# Patient Record
Sex: MALE | Race: WHITE | NOT HISPANIC OR LATINO | URBAN - METROPOLITAN AREA
[De-identification: names, ages, dates, MRNs, and addresses within clinical notes are randomized per-mention and may not be internally consistent; named-entity substitution may affect disease eponyms.]

---
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
## 2020-10-17 ENCOUNTER — WALK IN (OUTPATIENT)
Dept: URGENT CARE | Age: 20
End: 2020-10-17

## 2020-10-17 VITALS
HEART RATE: 66 BPM | DIASTOLIC BLOOD PRESSURE: 60 MMHG | SYSTOLIC BLOOD PRESSURE: 98 MMHG | RESPIRATION RATE: 16 BRPM | OXYGEN SATURATION: 97 % | TEMPERATURE: 98 F

## 2020-10-17 DIAGNOSIS — T30.4 CHEMICAL BURN: Primary | ICD-10-CM

## 2020-10-17 PROCEDURE — 99203 OFFICE O/P NEW LOW 30 MIN: CPT | Performed by: FAMILY MEDICINE

## 2020-10-17 RX ORDER — CEPHALEXIN 500 MG/1
500 CAPSULE ORAL 3 TIMES DAILY
Qty: 30 CAPSULE | Refills: 0 | Status: SHIPPED | OUTPATIENT
Start: 2020-10-17 | End: 2020-10-27

## 2020-10-19 ENCOUNTER — WORKER'S COMP (OUTPATIENT)
Dept: OCCUPATIONAL MEDICINE | Age: 20
End: 2020-10-19

## 2020-10-19 DIAGNOSIS — T23.569D: Primary | ICD-10-CM

## 2020-10-19 DIAGNOSIS — L85.3 DRY SKIN DERMATITIS: ICD-10-CM

## 2020-10-19 DIAGNOSIS — M79.642 BILATERAL HAND PAIN: ICD-10-CM

## 2020-10-19 DIAGNOSIS — Z02.71 ISSUE OF MEDICAL CERTIFICATE FOR DISABILITY EXAMINATION: ICD-10-CM

## 2020-10-19 DIAGNOSIS — M79.641 BILATERAL HAND PAIN: ICD-10-CM

## 2020-10-19 DIAGNOSIS — L03.119 CELLULITIS OF HAND: ICD-10-CM

## 2020-10-19 PROBLEM — T23.569A: Status: ACTIVE | Noted: 2020-10-19

## 2020-10-19 PROCEDURE — 99214 OFFICE O/P EST MOD 30 MIN: CPT | Performed by: PHYSICIAN ASSISTANT

## 2020-10-26 ENCOUNTER — APPOINTMENT (OUTPATIENT)
Dept: OCCUPATIONAL MEDICINE | Age: 20
End: 2020-10-26

## 2020-10-26 ENCOUNTER — WORKER'S COMP (OUTPATIENT)
Dept: OCCUPATIONAL MEDICINE | Age: 20
End: 2020-10-26

## 2020-10-26 VITALS
DIASTOLIC BLOOD PRESSURE: 68 MMHG | HEART RATE: 82 BPM | RESPIRATION RATE: 16 BRPM | SYSTOLIC BLOOD PRESSURE: 121 MMHG | TEMPERATURE: 97.7 F

## 2020-10-26 DIAGNOSIS — Z02.71 ISSUE OF MEDICAL CERTIFICATE FOR DISABILITY EXAMINATION: ICD-10-CM

## 2020-10-26 DIAGNOSIS — M79.642 BILATERAL HAND PAIN: ICD-10-CM

## 2020-10-26 DIAGNOSIS — L03.119 CELLULITIS OF HAND: ICD-10-CM

## 2020-10-26 DIAGNOSIS — L85.3 DRY SKIN DERMATITIS: ICD-10-CM

## 2020-10-26 DIAGNOSIS — T23.569D: Primary | ICD-10-CM

## 2020-10-26 DIAGNOSIS — M79.641 BILATERAL HAND PAIN: ICD-10-CM

## 2020-10-26 PROCEDURE — 99213 OFFICE O/P EST LOW 20 MIN: CPT | Performed by: PHYSICIAN ASSISTANT

## 2020-10-29 ENCOUNTER — TELEPHONE (OUTPATIENT)
Dept: OCCUPATIONAL MEDICINE | Age: 20
End: 2020-10-29

## 2024-02-27 ENCOUNTER — OFFICE VISIT (OUTPATIENT)
Dept: FAMILY MEDICINE CLINIC | Facility: CLINIC | Age: 24
End: 2024-02-27
Payer: COMMERCIAL

## 2024-02-27 VITALS
BODY MASS INDEX: 21.03 KG/M2 | WEIGHT: 142 LBS | HEIGHT: 69 IN | HEART RATE: 78 BPM | TEMPERATURE: 98 F | OXYGEN SATURATION: 98 % | RESPIRATION RATE: 18 BRPM | DIASTOLIC BLOOD PRESSURE: 60 MMHG | SYSTOLIC BLOOD PRESSURE: 120 MMHG

## 2024-02-27 DIAGNOSIS — H04.123 DRY EYES, BILATERAL: ICD-10-CM

## 2024-02-27 DIAGNOSIS — L20.9 ATOPIC DERMATITIS AND RELATED CONDITION: Primary | ICD-10-CM

## 2024-02-27 PROCEDURE — 99202 OFFICE O/P NEW SF 15 MIN: CPT | Performed by: NURSE PRACTITIONER

## 2024-02-27 NOTE — PROGRESS NOTES
Subjective:   Patient ID: Oleg Lynn is a 23 year old male.    Eye Problem   Both eyes are affected. This is a recurrent problem. The current episode started in the past 7 days. The problem occurs constantly. The problem has been unchanged. There was no injury mechanism. The patient is experiencing no pain. There is No known exposure to pink eye. He Does not wear contacts. Associated symptoms include an eye discharge, eye redness and itching. He has tried nothing for the symptoms.       History/Other:   Review of Systems   Eyes:  Positive for discharge, redness and itching.   All other systems reviewed and are negative.    Current Outpatient Medications   Medication Sig Dispense Refill    albuterol (PROVENTIL HFA) 108 (90 BASE) MCG/ACT Inhalation Aero Soln 2 puffs 20 minute prior to exercise and every 4-6 hours as needed; **Hold until pt requests (Patient not taking: Reported on 2/27/2024) 1 Inhaler 6     Allergies:  Allergies   Allergen Reactions    Neomycin-Polymyxin B PAIN     BURNED PT.'S EYES     Tobramycin OTHER (SEE COMMENTS)     Burning pain when using Tobrex eye drops     /60   Pulse 78   Temp 98 °F (36.7 °C) (Oral)   Resp 18   Ht 5' 9\" (1.753 m)   Wt 142 lb (64.4 kg)   SpO2 98%   BMI 20.97 kg/m²       Objective:   Physical Exam  Constitutional:       General: He is not in acute distress.     Appearance: Normal appearance. He is not ill-appearing.   HENT:      Head: Normocephalic.      Right Ear: Ear canal and external ear normal. A middle ear effusion is present.      Left Ear: External ear normal. A middle ear effusion is present.   Eyes:      General:         Right eye: No discharge.         Left eye: No discharge.      Extraocular Movements: Extraocular movements intact.      Conjunctiva/sclera: Conjunctivae normal.      Right eye: Right conjunctiva is not injected. No chemosis, exudate or hemorrhage.     Left eye: Left conjunctiva is not injected. No chemosis, exudate or hemorrhage.      Pupils: Pupils are equal, round, and reactive to light.      Comments: Atopic dermatitis noted to bilateral eye upper and lower lids   Cardiovascular:      Rate and Rhythm: Normal rate.      Pulses: Normal pulses.   Pulmonary:      Effort: Pulmonary effort is normal. No respiratory distress.      Breath sounds: Normal breath sounds.   Musculoskeletal:         General: Normal range of motion.      Cervical back: Normal range of motion and neck supple.   Lymphadenopathy:      Cervical: No cervical adenopathy.   Skin:     General: Skin is warm and dry.      Comments: Bilateral hands reddened, scaled, dry from fingers to wrists   Neurological:      Mental Status: He is alert. Mental status is at baseline.   Psychiatric:         Mood and Affect: Mood normal.         Behavior: Behavior normal.         Assessment & Plan:   1. Atopic dermatitis and related condition    2. Dry eyes, bilateral        No orders of the defined types were placed in this encounter.      Meds This Visit:  Requested Prescriptions      No prescriptions requested or ordered in this encounter       Patient Instructions   Please start Allegra (fexofenadine) one tablet daily for the next two weeks.  May use either an artificial tear drop such as Systane or Refresh daily for dry eyes, or Zaditor (allergy drop) twice daily during symptoms.  Follow up with Dr. Ham in two weeks if symptoms persist.        Medication use and risk/benefit discussed with over the counter treatment. Comfort care discussed. Follow up with PCP if symptoms persist. Patient verbalized understanding and agrees to plan.

## 2024-02-27 NOTE — PATIENT INSTRUCTIONS
Please start Allegra (fexofenadine) one tablet daily for the next two weeks.  May use either an artificial tear drop such as Systane or Refresh daily for dry eyes, or Zaditor (allergy drop) twice daily during symptoms.  Follow up with Dr. Ham in two weeks if symptoms persist.